# Patient Record
Sex: MALE | Race: WHITE
[De-identification: names, ages, dates, MRNs, and addresses within clinical notes are randomized per-mention and may not be internally consistent; named-entity substitution may affect disease eponyms.]

---

## 2018-02-14 ENCOUNTER — HOSPITAL ENCOUNTER (EMERGENCY)
Dept: HOSPITAL 56 - MW.ED | Age: 53
Discharge: HOME | End: 2018-02-14
Payer: SELF-PAY

## 2018-02-14 VITALS — SYSTOLIC BLOOD PRESSURE: 162 MMHG | DIASTOLIC BLOOD PRESSURE: 98 MMHG

## 2018-02-14 DIAGNOSIS — F17.210: ICD-10-CM

## 2018-02-14 DIAGNOSIS — I10: ICD-10-CM

## 2018-02-14 DIAGNOSIS — R10.32: Primary | ICD-10-CM

## 2018-02-14 DIAGNOSIS — N32.89: ICD-10-CM

## 2018-02-14 LAB
CHLORIDE SERPL-SCNC: 104 MMOL/L (ref 98–110)
SODIUM SERPL-SCNC: 137 MMOL/L (ref 136–146)

## 2018-02-14 PROCEDURE — 96372 THER/PROPH/DIAG INJ SC/IM: CPT

## 2018-02-14 PROCEDURE — 96361 HYDRATE IV INFUSION ADD-ON: CPT

## 2018-02-14 PROCEDURE — 82150 ASSAY OF AMYLASE: CPT

## 2018-02-14 PROCEDURE — 36415 COLL VENOUS BLD VENIPUNCTURE: CPT

## 2018-02-14 PROCEDURE — 81001 URINALYSIS AUTO W/SCOPE: CPT

## 2018-02-14 PROCEDURE — 96374 THER/PROPH/DIAG INJ IV PUSH: CPT

## 2018-02-14 PROCEDURE — 85025 COMPLETE CBC W/AUTO DIFF WBC: CPT

## 2018-02-14 PROCEDURE — 83690 ASSAY OF LIPASE: CPT

## 2018-02-14 PROCEDURE — 99284 EMERGENCY DEPT VISIT MOD MDM: CPT

## 2018-02-14 PROCEDURE — 87591 N.GONORRHOEAE DNA AMP PROB: CPT

## 2018-02-14 PROCEDURE — 87086 URINE CULTURE/COLONY COUNT: CPT

## 2018-02-14 PROCEDURE — 80053 COMPREHEN METABOLIC PANEL: CPT

## 2018-02-14 PROCEDURE — 87491 CHLMYD TRACH DNA AMP PROBE: CPT

## 2018-02-14 PROCEDURE — 84484 ASSAY OF TROPONIN QUANT: CPT

## 2018-02-14 PROCEDURE — 74178 CT ABD&PLV WO CNTR FLWD CNTR: CPT

## 2018-02-14 NOTE — CT
CT of the abdomen and pelvis with and without contrast.

 

HISTORY: Pain

 

TECHNIQUE: Axial CT images were obtained of the abdomen and pelvis before and following administratio
n of 100 mL of Isovue-370 in the right antecubital fossa without complication. Coronal and sagittal r
econstructions obtained.

 

FINDINGS: 

Mild atelectasis is noted within the lung bases.

 

There is a slightly hyperdense 2.4 cm enhancing nodule within the inferior left lateral hepatic lobe.
 This demonstrates a slightly hypodense central component. The gallbladder, adrenal glands, and splee
n appear normal. The pancreas appears normal. No bulky retroperitoneal lymphadenopathy and abdominal 
ascites.

 

 The kidneys enhance and function symmetrically without evidence of obstructive uropathy. Tiny renal 
cortical cysts are noted.

 

The large and small bowel are normal in caliber without evidence of obstruction. No focal pericolonic
 inflammation or stranding. The appendix is normal. Mild diffuse urinary bladder wall thickening. Mes
h is noted within the anterior pelvis bilaterally. No bulky pelvic lymphadenopathy or free pelvic flu
id. Prostate is mildly prominent.

 

No suspicious osseous abnormalities. Endplate sclerosis noted at L2-L3.

 

IMPRESSION: 

 

1. There is a 2.4 cm nodule within the left hepatic lobe. Correlation with previous imaging or a hepa
tic cortical CT/MRI may be beneficial.

2. Mild bladder wall thickening, possibly secondary to incomplete distention however correlation for 
a UTI may be beneficial.

3. Otherwise no acute findings within the abdomen or pelvis.

## 2018-02-14 NOTE — EDM.PDOC
ED HPI GENERAL MEDICAL PROBLEM





- General


Chief Complaint: Abdominal Pain


Stated Complaint: LOWER LEFT SIDE PAIN


Time Seen by Provider: 02/14/18 07:21


Source of Information: Reports: Patient





- History of Present Illness


INITIAL COMMENTS - FREE TEXT/NARRATIVE: 





HISTORY AND PHYSICAL:


History of present illness:


[Patient presents with left lower abdomen pain, he had an episode 3 weeks prior 

which she rated 8 out of 10 he had to lay on the floordue to pain this lasted 

for about 15-30 minutes] and resolved.





Again this morning he has an episode of 8 out of 10 pain which has eased to a 1 

out of 10 pain while he has been here no fever nausea vomiting chills sweats no 

chest pain shortness breath headache dizziness palpitation no bowel or urine 

symptoms











She notes he has had a new sexual partner around the time symptoms began.





He also is concerned about possible hernia as he has a history of bilateral 

role hernia with mesh repair








Review of systems: 


As per history of present illness and below otherwise all systems reviewed and 

negative.


Past medical history: 


As per history of present illness and as reviewed below otherwise 

noncontributory.


Surgical history: 


As per history of present illness and as reviewed below otherwise 

noncontributory.


Social history: 


No reported history of drug or alcohol abuse.


Family history: 


As per history of present illness and as reviewed below otherwise 

noncontributory.








Physical exam:


HEENT: Atraumatic, normocephalic, pupils reactive, negative for conjunctival 

pallor or scleral icterus, mucous membranes moist, throat clear, neck supple, 

nontender, trachea midline.


Lungs: Clear to auscultation, breath sounds equal bilaterally, chest nontender.


Heart: S1S2, regular, negative for clicks, rubs, or JVD.


Abdomen: Soft, nondistended, nontender. Negative for masses or 

hepatosplenomegaly. Negative for costovertebral tenderness.


Pelvis: Stable nontender.


Genitourinary: normal male genitalia no exudate at the meatus no mass scar or 

lesion no hernia appreciated


Rectal: Deferred.


Extremities: Atraumatic, negative for cords or calf pain. Neurovascular 

unremarkable.


Neuro: Awake, alert, oriented. Cranial nerves II through XII unremarkable. 

Cerebellum unremarkable. Motor and sensory unremarkable throughout. Exam 

nonfocal.


Diagnostics:


[CBC CMP UA with culture amylase lipase troponin


]GC Chlamydia


CT abdomen pelvis with and without contrast








Therapeutics:


[Rocephin 250 mg IM


Azithromycin 1000 mg by mouth


Cipro 500 by mouth twice a day #20 no refill





Follow cultures redirect





]


Impression: 


Thickened bladder wall on CT


[Flank/abdominal pain on left]


Definitive disposition and diagnosis as appropriate pending reevaluation and 

review of above.


  ** Left Lower Abdomen


Pain Score (Numeric/FACES): 3





- Related Data


 Allergies











Allergy/AdvReac Type Severity Reaction Status Date / Time


 


No Known Allergies Allergy   Verified 02/14/18 07:22











Home Meds: 


 Home Meds





. [No Known Home Meds]  03/27/15 [History]











Past Medical History


HEENT History: Reports: Impaired Vision


Other HEENT History: glasses


Cardiovascular History: Reports: Hypertension


Respiratory History: Reports: None


Gastrointestinal History: Reports: Other (See Below)


Other Gastrointestinal History: double hernia


Genitourinary History: Reports: None


Musculoskeletal History: Reports: None


Neurological History: Reports: None


Psychiatric History: Reports: Anxiety, Dementia


Endocrine/Metabolic History: Reports: None


Hematologic History: Reports: None


Immunologic History: Reports: None


Oncologic (Cancer) History: Reports: None


Dermatologic History: Reports: None





- Infectious Disease History


Infectious Disease History: Reports: None





- Past Surgical History


GI Surgical History: Reports: Hernia Repair/Other


Musculoskeletal Surgical History: Reports: Other (See Below)





Social & Family History





- Family History


Family Medical History: Noncontributory





- Tobacco Use


Smoking Status *Q: Current Every Day Smoker


Years of Tobacco use: 20


Packs/Tins Daily: 1


Second Hand Smoke Exposure: No





- Alcohol Use


Days Per Week of Alcohol Use: 7


Number of Drinks Per Day: 2


Total Drinks Per Week: 14





- Recreational Drug Use


Recreational Drug Use: Yes


Drug Use in Last 12 Months: Yes


Recreational Drug Type: Reports: Marijuana/Hashish


Recreational Drug Use Frequency: Not Used In Over 3 Months





ED ROS GENERAL





- Review of Systems


Review Of Systems: ROS reveals no pertinent complaints other than HPI.





ED EXAM, GENERAL





- Physical Exam


Exam: See Below





Course





- Vital Signs


Last Recorded V/S: 


 Last Vital Signs











Temp  98.2 F   02/14/18 08:09


 


Pulse  71   02/14/18 09:17


 


Resp  16   02/14/18 09:17


 


BP  155/106 H  02/14/18 09:17


 


Pulse Ox  95   02/14/18 09:17














- Orders/Labs/Meds


Orders: 


 Active Orders 24 hr











 Category Date Time Status


 


 CHLAMYDIA AND GONORRHEA BY [facility] Stat Lab  02/14/18 10:23 Ordered


 


 CULTURE URINE [RM] Stat Lab  02/14/18 09:49 Received











Labs: 


 Laboratory Tests











  02/14/18 02/14/18 02/14/18 Range/Units





  07:12 07:36 07:36 


 


WBC   7.09   (4.0-11.0)  K/uL


 


RBC   5.41   (4.50-5.90)  M/uL


 


Hgb   15.9   (13.0-17.0)  g/dL


 


Hct   47.2   (38.0-50.0)  %


 


MCV   87.2   (80.0-98.0)  fL


 


MCH   29.4   (27.0-32.0)  pg


 


MCHC   33.7   (31.0-37.0)  g/dL


 


RDW Std Deviation   42.4   (28.0-62.0)  fl


 


RDW Coeff of Rosaura   14   (11.0-15.0)  %


 


Plt Count   217   (150-400)  K/uL


 


MPV   8.90   (7.40-12.00)  fL


 


Neut % (Auto)   62.3   (48.0-80.0)  %


 


Lymph % (Auto)   28.8   (16.0-40.0)  %


 


Mono % (Auto)   6.5   (0.0-15.0)  %


 


Eos % (Auto)   2.1   (0.0-7.0)  %


 


Baso % (Auto)   0.3   (0.0-1.5)  %


 


Neut # (Auto)   4.4   (1.4-5.7)  K/uL


 


Lymph # (Auto)   2.0   (0.6-2.4)  K/uL


 


Mono # (Auto)   0.5   (0.0-0.8)  K/uL


 


Eos # (Auto)   0.2   (0.0-0.7)  K/uL


 


Baso # (Auto)   0.0   (0.0-0.1)  K/uL


 


Nucleated RBC %   0.0   /100WBC


 


Nucleated RBCs #   0   K/uL


 


Sodium    137  (136-146)  mmol/L


 


Potassium    4.1  (3.5-5.1)  mmol/L


 


Chloride    104  ()  mmol/L


 


Carbon Dioxide    24  (21-31)  mmol/L


 


BUN    13  (6.0-23.0)  mg/dL


 


Creatinine    0.9  (0.6-1.5)  mg/dL


 


Est Cr Clr Drug Dosing    96.88  mL/min


 


Estimated GFR (MDRD)    > 60.0  ml/min


 


Glucose    126 H  ()  mg/dL


 


Calcium    9.5  (8.8-10.8)  mg/dL


 


Total Bilirubin    0.5  (0.1-1.5)  mg/dL


 


AST    25  (5-40)  IU/L


 


ALT    49  (8-54)  IU/L


 


Alkaline Phosphatase    97  ()  


 


Troponin I     (0.0-0.29)  NG/ML


 


Total Protein    6.7  (6.0-8.0)  g/dL


 


Albumin    3.9  (3.5-5.0)  g/dL


 


Globulin    2.8  (2.0-3.5)  g/dL


 


Albumin/Globulin Ratio    1.4  (1.3-2.8)  


 


Amylase     (10-90)  U/L


 


Lipase     (7-80)  U/L


 


Urine Color  YELLOW    


 


Urine Appearance  CLEAR    


 


Urine pH  6.5    (5.0-8.0)  


 


Ur Specific Gravity  1.010    (1.001-1.035)  


 


Urine Protein  NEGATIVE    (NEGATIVE)  mg/dL


 


Urine Glucose (UA)  NEGATIVE    (NEGATIVE)  mg/dL


 


Urine Ketones  NEGATIVE    (NEGATIVE)  mg/dL


 


Urine Occult Blood  NEGATIVE    (NEGATIVE)  


 


Urine Nitrite  NEGATIVE    (NEGATIVE)  


 


Urine Bilirubin  NEGATIVE    (NEGATIVE)  


 


Urine Urobilinogen  0.2    (<2.0)  EU/dL


 


Ur Leukocyte Esterase  NEGATIVE    (NEGATIVE)  


 


Urine RBC  0-1    (0-2/HPF)  


 


Urine WBC  0-1    (0-5/HPF)  


 


Ur Epithelial Cells  RARE    (NONE-FEW)  


 


Urine Bacteria  RARE    (NEGATIVE)  


 


Urine Mucus  LIGHT    (NONE-MOD)  














  02/14/18 Range/Units





  07:36 


 


WBC   (4.0-11.0)  K/uL


 


RBC   (4.50-5.90)  M/uL


 


Hgb   (13.0-17.0)  g/dL


 


Hct   (38.0-50.0)  %


 


MCV   (80.0-98.0)  fL


 


MCH   (27.0-32.0)  pg


 


MCHC   (31.0-37.0)  g/dL


 


RDW Std Deviation   (28.0-62.0)  fl


 


RDW Coeff of Rosaura   (11.0-15.0)  %


 


Plt Count   (150-400)  K/uL


 


MPV   (7.40-12.00)  fL


 


Neut % (Auto)   (48.0-80.0)  %


 


Lymph % (Auto)   (16.0-40.0)  %


 


Mono % (Auto)   (0.0-15.0)  %


 


Eos % (Auto)   (0.0-7.0)  %


 


Baso % (Auto)   (0.0-1.5)  %


 


Neut # (Auto)   (1.4-5.7)  K/uL


 


Lymph # (Auto)   (0.6-2.4)  K/uL


 


Mono # (Auto)   (0.0-0.8)  K/uL


 


Eos # (Auto)   (0.0-0.7)  K/uL


 


Baso # (Auto)   (0.0-0.1)  K/uL


 


Nucleated RBC %   /100WBC


 


Nucleated RBCs #   K/uL


 


Sodium   (136-146)  mmol/L


 


Potassium   (3.5-5.1)  mmol/L


 


Chloride   ()  mmol/L


 


Carbon Dioxide   (21-31)  mmol/L


 


BUN   (6.0-23.0)  mg/dL


 


Creatinine   (0.6-1.5)  mg/dL


 


Est Cr Clr Drug Dosing   mL/min


 


Estimated GFR (MDRD)   ml/min


 


Glucose   ()  mg/dL


 


Calcium   (8.8-10.8)  mg/dL


 


Total Bilirubin   (0.1-1.5)  mg/dL


 


AST   (5-40)  IU/L


 


ALT   (8-54)  IU/L


 


Alkaline Phosphatase   ()  


 


Troponin I  < 0.10  (0.0-0.29)  NG/ML


 


Total Protein   (6.0-8.0)  g/dL


 


Albumin   (3.5-5.0)  g/dL


 


Globulin   (2.0-3.5)  g/dL


 


Albumin/Globulin Ratio   (1.3-2.8)  


 


Amylase  30  (10-90)  U/L


 


Lipase  < 9  (7-80)  U/L


 


Urine Color   


 


Urine Appearance   


 


Urine pH   (5.0-8.0)  


 


Ur Specific Gravity   (1.001-1.035)  


 


Urine Protein   (NEGATIVE)  mg/dL


 


Urine Glucose (UA)   (NEGATIVE)  mg/dL


 


Urine Ketones   (NEGATIVE)  mg/dL


 


Urine Occult Blood   (NEGATIVE)  


 


Urine Nitrite   (NEGATIVE)  


 


Urine Bilirubin   (NEGATIVE)  


 


Urine Urobilinogen   (<2.0)  EU/dL


 


Ur Leukocyte Esterase   (NEGATIVE)  


 


Urine RBC   (0-2/HPF)  


 


Urine WBC   (0-5/HPF)  


 


Ur Epithelial Cells   (NONE-FEW)  


 


Urine Bacteria   (NEGATIVE)  


 


Urine Mucus   (NONE-MOD)  











Meds: 


Medications














Discontinued Medications














Generic Name Dose Route Start Last Admin





  Trade Name Micheleq  PRN Reason Stop Dose Admin


 


Azithromycin  1,000 mg  02/14/18 10:23  





  Zithromax  PO  02/14/18 10:24  





  NOW STA   


 


Sodium Chloride  1,000 mls @ 999 mls/hr  02/14/18 08:29  02/14/18 09:12





  Normal Saline  IV  02/14/18 09:29  999 mls/hr





  STAT ONE   Administration


 


Ceftriaxone Sodium 250 mg/  1 mls @ 1 mls/sec  02/14/18 10:23  





  Lidocaine HCl  IM  02/14/18 10:24  





  ONETIME ONE   


 


Iopamidol  100 ml  02/14/18 08:33  02/14/18 08:37





  Isovue Multipack-370 (76%)  IVPUSH  02/14/18 08:34  100 ml





  ONETIME STA   Administration


 


Ketorolac Tromethamine  30 mg  02/14/18 08:30  02/14/18 09:15





  Toradol  IVPUSH  02/14/18 08:31  30 mg





  ONETIME ONE   Administration














Departure





- Departure


Time of Disposition: 10:26


Disposition: Home, Self-Care 01


Condition: Good


Clinical Impression: 


 Abdominal pain








- Discharge Information


Referrals: 


PCP,None [Primary Care Provider] - 


Forms:  ED Department Discharge


Additional Instructions: 


Urine cultures are pending as discussed


Medication as prescribed


Return if symptoms persist or worsen


Follow-up with primary care in 2 weeks sooner as needed





[facility] - Primary Care


[address] [place] [id number]


Phone: [phone]


Fax: [phone]








The following information is given to patients seen in the emergency department 

who are being discharged to home. This information is to outline your options 

for follow-up care. We provide all patients seen in our emergency department 

with a follow-up referral.





The need for follow-up, as well as the timing and circumstances, are variable 

depending upon the specifics of your emergency department visit.





If you don't have a primary care physician on staff, we will provide you with a 

referral. We always advise you to contact your personal physician following an 

emergency department visit to inform them of the circumstance of the visit and 

for follow-up with them and/or the need for any referrals to a consulting 

specialist.





The emergency department will also refer you to a specialist when appropriate. 

This referral assures that you have the opportunity for follow-up care with a 

specialist. All of these measure are taken in an effort to provide you with 

optimal care, which includes your follow-up.





Under all circumstances we always encourage you to contact your private 

physician who remains a resource for coordinating your care. When calling for 

follow-up care, please make the office aware that this follow-up is from your 

recent emergency room visit. If for any reason you are refused follow-up, 

please contact the [facility] emergency department at [phone] 

and asked to speak to the emergency department charge nurse.








- My Orders


Last 24 Hours: 


My Active Orders





02/14/18 09:49


CULTURE URINE [RM] Stat 





02/14/18 10:23


CHLAMYDIA AND GONORRHEA BY [facility]MA Stat 














- Assessment/Plan


Last 24 Hours: 


My Active Orders





02/14/18 09:49


CULTURE URINE [RM] Stat 





02/14/18 10:23


CHLAMYDIA AND GONORRHEA BY TMA Stat none

## 2018-05-04 ENCOUNTER — HOSPITAL ENCOUNTER (EMERGENCY)
Dept: HOSPITAL 56 - MW.ED | Age: 53
Discharge: HOME | End: 2018-05-04
Payer: SELF-PAY

## 2018-05-04 VITALS — SYSTOLIC BLOOD PRESSURE: 184 MMHG | DIASTOLIC BLOOD PRESSURE: 123 MMHG

## 2018-05-04 DIAGNOSIS — R10.9: ICD-10-CM

## 2018-05-04 DIAGNOSIS — F19.10: Primary | ICD-10-CM

## 2018-05-04 DIAGNOSIS — F17.210: ICD-10-CM

## 2018-05-04 DIAGNOSIS — I10: ICD-10-CM

## 2018-05-04 LAB
CHLORIDE SERPL-SCNC: 105 MMOL/L (ref 98–107)
SODIUM SERPL-SCNC: 138 MMOL/L (ref 136–148)

## 2018-05-04 PROCEDURE — 83690 ASSAY OF LIPASE: CPT

## 2018-05-04 PROCEDURE — 96374 THER/PROPH/DIAG INJ IV PUSH: CPT

## 2018-05-04 PROCEDURE — 99284 EMERGENCY DEPT VISIT MOD MDM: CPT

## 2018-05-04 PROCEDURE — 85610 PROTHROMBIN TIME: CPT

## 2018-05-04 PROCEDURE — 71045 X-RAY EXAM CHEST 1 VIEW: CPT

## 2018-05-04 PROCEDURE — 81001 URINALYSIS AUTO W/SCOPE: CPT

## 2018-05-04 PROCEDURE — 36415 COLL VENOUS BLD VENIPUNCTURE: CPT

## 2018-05-04 PROCEDURE — 96361 HYDRATE IV INFUSION ADD-ON: CPT

## 2018-05-04 PROCEDURE — 82150 ASSAY OF AMYLASE: CPT

## 2018-05-04 PROCEDURE — 80053 COMPREHEN METABOLIC PANEL: CPT

## 2018-05-04 PROCEDURE — 85025 COMPLETE CBC W/AUTO DIFF WBC: CPT

## 2018-05-04 PROCEDURE — 80305 DRUG TEST PRSMV DIR OPT OBS: CPT

## 2018-05-04 PROCEDURE — 93005 ELECTROCARDIOGRAM TRACING: CPT

## 2018-05-04 PROCEDURE — 74176 CT ABD & PELVIS W/O CONTRAST: CPT

## 2018-05-04 NOTE — EDM.PDOC
ED HPI GENERAL MEDICAL PROBLEM





- General


Chief Complaint: Abdominal Pain


Stated Complaint: ABDOMINAL PAIN


Time Seen by Provider: 05/04/18 09:55





- History of Present Illness


INITIAL COMMENTS - FREE TEXT/NARRATIVE: 


HISTORY AND PHYSICAL:





History of present illness:


Patient 52-year-old white male presents with concern of left-sided abdominal 

pain he's had nausea he denies vomiting diarrhea he has some labs on February 

with an extensive workup that was negative. He denies urinary symptoms or other 

concerns





Review of systems: 


As per history of present illness and below otherwise all systems reviewed and 

negative.





Past medical history: 


As per history of present illness and as reviewed below otherwise 

noncontributory.





Surgical history: 


As per history of present illness and as reviewed below otherwise 

noncontributory.





Social history: 


No reported history of drug or alcohol abuse.





Family history: 


As per history of present illness and as reviewed below otherwise 

noncontributory.





Physical exam:


HEENT: Atraumatic, normocephalic, pupils reactive, negative for conjunctival 

pallor or scleral icterus, mucous membranes moist, throat clear, neck supple, 

nontender, trachea midline.


Lungs: Clear to auscultation, breath sounds equal bilaterally, chest nontender.


Heart: S1S2, regular, negative for clicks, rubs, or JVD.


Abdomen: Soft, nondistended, nonlocalized left-sided tenderness Negative for 

masses or hepatosplenomegaly. Negative for costovertebral tenderness.


Pelvis: Stable nontender.


Genitourinary: Deferred.


Rectal: Deferred.


Extremities: Atraumatic, negative for cords or calf pain. Neurovascular 

unremarkable.


Neuro: Awake, alert, oriented. Cranial nerves II through XII unremarkable. 

Cerebellum unremarkable. Motor and sensory unremarkable throughout. Exam 

nonfocal.





Diagnostics:


CBC CMP UA UDS lipase EKG CT abdomen and pelvis





Therapeutics:


Saline 1 L bolus Toradol 30 mg IV Zofran 4 mg IV





Impression: 


#1 left-sided abdominal pain





Definitive disposition and diagnosis as appropriate pending reevaluation and 

review of above.








- Related Data


 Allergies











Allergy/AdvReac Type Severity Reaction Status Date / Time


 


No Known Allergies Allergy   Verified 02/14/18 07:22











Home Meds: 


 Home Meds





Multivitamins/Minerals [Vitamins and Minerals]  05/04/18 [History]











Past Medical History


HEENT History: Reports: Impaired Vision


Other HEENT History: glasses


Cardiovascular History: Reports: Hypertension


Respiratory History: Reports: None


Gastrointestinal History: Reports: Other (See Below)


Other Gastrointestinal History: double hernia


Genitourinary History: Reports: None


Musculoskeletal History: Reports: None


Neurological History: Reports: None


Psychiatric History: Reports: Anxiety, Dementia


Endocrine/Metabolic History: Reports: None


Hematologic History: Reports: None


Immunologic History: Reports: None


Oncologic (Cancer) History: Reports: None


Dermatologic History: Reports: None





- Infectious Disease History


Infectious Disease History: Reports: None





- Past Surgical History


GI Surgical History: Reports: Hernia Repair/Other


Musculoskeletal Surgical History: Reports: Other (See Below)





Social & Family History





- Family History


Family Medical History: Noncontributory





- Tobacco Use


Smoking Status *Q: Current Every Day Smoker


Years of Tobacco use: 20


Packs/Tins Daily: 1


Second Hand Smoke Exposure: No





- Caffeine Use


Caffeine Use: Reports: Coffee





- Alcohol Use


Days Per Week of Alcohol Use: 7


Number of Drinks Per Day: 2


Total Drinks Per Week: 14





- Recreational Drug Use


Recreational Drug Use: Yes


Drug Use in Last 12 Months: Yes


Recreational Drug Type: Reports: Marijuana/Hashish


Other Recreational Drug Type: one time use in the last 12 months


Recreational Drug Use Frequency: Not Used In Over 3 Months





ED ROS GENERAL





- Review of Systems


Review Of Systems: ROS reveals no pertinent complaints other than HPI.





ED EXAM, GENERAL





- Physical Exam


Exam: See Below (The dictation)





Course





- Vital Signs


Last Recorded V/S: 


 Last Vital Signs











Temp  36.5 C   05/04/18 10:07


 


Pulse  85   05/04/18 10:07


 


Resp  20   05/04/18 10:07


 


BP  184/123 H  05/04/18 10:07


 


Pulse Ox  98   05/04/18 10:07














- Orders/Labs/Meds


Orders: 


 Active Orders 24 hr











 Category Date Time Status


 


 EKG Documentation Completion [RC] STAT Care  05/04/18 09:55 Active


 


 CDIFF TOX A+B [OP] Stat Lab  05/04/18 10:03 Ordered


 


 CULTURE STOOL + CAMPY+SHIGATOX [RM] Stat Lab  05/04/18 10:03 Ordered


 


 DRUG SCREEN, URINE [URCHEM] Stat Lab  05/04/18 11:20 Ordered


 


 UA W/MICROSCOPIC [URIN] Stat Lab  05/04/18 11:20 Ordered


 


 Sodium Chloride 0.9% [Saline Flush] Med  05/04/18 09:55 Active





 10 ml FLUSH ASDIRECTED PRN   


 


 Sodium Chloride 0.9% [Saline Flush] Med  05/04/18 09:55 Active





 2.5 ml FLUSH ASDIRECTED PRN   


 


 Saline Lock Insert [OM.PC] Stat Oth  05/04/18 09:55 Ordered








 Medication Orders





Sodium Chloride (Saline Flush)  10 ml FLUSH ASDIRECTED PRN


   PRN Reason: Keep Vein Open


Sodium Chloride (Saline Flush)  2.5 ml FLUSH ASDIRECTED PRN


   PRN Reason: Keep Vein Open








Labs: 


 Laboratory Tests











  05/04/18 05/04/18 05/04/18 Range/Units





  10:05 10:05 10:05 


 


WBC  7.33    (4.0-11.0)  K/uL


 


RBC  5.10    (4.50-5.90)  M/uL


 


Hgb  15.2    (13.0-17.0)  g/dL


 


Hct  43.8    (38.0-50.0)  %


 


MCV  85.9    (80.0-98.0)  fL


 


MCH  29.8    (27.0-32.0)  pg


 


MCHC  34.7    (31.0-37.0)  g/dL


 


RDW Std Deviation  41.4    (28.0-62.0)  fl


 


RDW Coeff of Rosaura  13    (11.0-15.0)  %


 


Plt Count  249    (150-400)  K/uL


 


MPV  8.80    (7.40-12.00)  fL


 


Neut % (Auto)  55.7    (48.0-80.0)  %


 


Lymph % (Auto)  35.3    (16.0-40.0)  %


 


Mono % (Auto)  6.3    (0.0-15.0)  %


 


Eos % (Auto)  2.3    (0.0-7.0)  %


 


Baso % (Auto)  0.4    (0.0-1.5)  %


 


Neut # (Auto)  4.1    (1.4-5.7)  K/uL


 


Lymph # (Auto)  2.6 H    (0.6-2.4)  K/uL


 


Mono # (Auto)  0.5    (0.0-0.8)  K/uL


 


Eos # (Auto)  0.2    (0.0-0.7)  K/uL


 


Baso # (Auto)  0.0    (0.0-0.1)  K/uL


 


Nucleated RBC %  0.0    /100WBC


 


Nucleated RBCs #  0    K/uL


 


INR   0.97   


 


Sodium    138  (136-148)  mmol/L


 


Potassium    3.9  (3.5-5.1)  mmol/L


 


Chloride    105  ()  mmol/L


 


Carbon Dioxide    22.6  (21.0-32.0)  mmol/L


 


BUN    20 H  (7.0-18.0)  mg/dL


 


Creatinine    1.0  (0.8-1.3)  mg/dL


 


Est Cr Clr Drug Dosing    TNP  


 


Estimated GFR (MDRD)    > 60.0  ml/min


 


Glucose    156 H  ()  mg/dL


 


Calcium    9.7  (8.5-10.1)  mg/dL


 


Total Bilirubin    0.3  (0.2-1.0)  mg/dL


 


AST    31  (15-37)  IU/L


 


ALT    48  (14-63)  IU/L


 


Alkaline Phosphatase    133 H  ()  U/L


 


Total Protein    7.7  (6.4-8.2)  g/dL


 


Albumin    3.9  (3.4-5.0)  g/dL


 


Globulin    3.8 H  (2.0-3.5)  g/dL


 


Albumin/Globulin Ratio    1.0 L  (1.3-2.8)  


 


Amylase    26  ()  U/L


 


Lipase    65 L  ()  U/L


 


Urine Color     


 


Urine Appearance     


 


Urine pH     (5.0-8.0)  


 


Ur Specific Gravity     (1.001-1.035)  


 


Urine Protein     (NEGATIVE)  mg/dL


 


Urine Glucose (UA)     (NEGATIVE)  mg/dL


 


Urine Ketones     (NEGATIVE)  mg/dL


 


Urine Occult Blood     (NEGATIVE)  


 


Urine Nitrite     (NEGATIVE)  


 


Urine Bilirubin     (NEGATIVE)  


 


Urine Urobilinogen     (<2.0)  EU/dL


 


Ur Leukocyte Esterase     (NEGATIVE)  


 


Urine RBC     (0-2/HPF)  


 


Urine WBC     (0-5/HPF)  


 


Ur Epithelial Cells     (NONE-FEW)  


 


Urine Bacteria     (NEGATIVE)  


 


Urine Opiates Screen     (NEGATIVE)  


 


Ur Oxycodone Screen     (NEGATIVE)  


 


Urine Methadone Screen     (NEGATIVE)  


 


Ur Barbiturates Screen     (NEGATIVE)  


 


Ur Phencyclidine Scrn     (NEGATIVE)  


 


Ur Amphetamine Screen     (NEGATIVE)  


 


U Methamphetamines Scrn     (NEGATIVE)  


 


U Benzodiazepines Scrn     (NEGATIVE)  


 


U Cocaine Metab Screen     (NEGATIVE)  


 


U Marijuana (THC) Screen     (NEGATIVE)  














  05/04/18 05/04/18 Range/Units





  11:20 11:20 


 


WBC    (4.0-11.0)  K/uL


 


RBC    (4.50-5.90)  M/uL


 


Hgb    (13.0-17.0)  g/dL


 


Hct    (38.0-50.0)  %


 


MCV    (80.0-98.0)  fL


 


MCH    (27.0-32.0)  pg


 


MCHC    (31.0-37.0)  g/dL


 


RDW Std Deviation    (28.0-62.0)  fl


 


RDW Coeff of Rosaura    (11.0-15.0)  %


 


Plt Count    (150-400)  K/uL


 


MPV    (7.40-12.00)  fL


 


Neut % (Auto)    (48.0-80.0)  %


 


Lymph % (Auto)    (16.0-40.0)  %


 


Mono % (Auto)    (0.0-15.0)  %


 


Eos % (Auto)    (0.0-7.0)  %


 


Baso % (Auto)    (0.0-1.5)  %


 


Neut # (Auto)    (1.4-5.7)  K/uL


 


Lymph # (Auto)    (0.6-2.4)  K/uL


 


Mono # (Auto)    (0.0-0.8)  K/uL


 


Eos # (Auto)    (0.0-0.7)  K/uL


 


Baso # (Auto)    (0.0-0.1)  K/uL


 


Nucleated RBC %    /100WBC


 


Nucleated RBCs #    K/uL


 


INR    


 


Sodium    (136-148)  mmol/L


 


Potassium    (3.5-5.1)  mmol/L


 


Chloride    ()  mmol/L


 


Carbon Dioxide    (21.0-32.0)  mmol/L


 


BUN    (7.0-18.0)  mg/dL


 


Creatinine    (0.8-1.3)  mg/dL


 


Est Cr Clr Drug Dosing    


 


Estimated GFR (MDRD)    ml/min


 


Glucose    ()  mg/dL


 


Calcium    (8.5-10.1)  mg/dL


 


Total Bilirubin    (0.2-1.0)  mg/dL


 


AST    (15-37)  IU/L


 


ALT    (14-63)  IU/L


 


Alkaline Phosphatase    ()  U/L


 


Total Protein    (6.4-8.2)  g/dL


 


Albumin    (3.4-5.0)  g/dL


 


Globulin    (2.0-3.5)  g/dL


 


Albumin/Globulin Ratio    (1.3-2.8)  


 


Amylase    ()  U/L


 


Lipase    ()  U/L


 


Urine Color  YELLOW   


 


Urine Appearance  CLEAR   


 


Urine pH  6.0   (5.0-8.0)  


 


Ur Specific Gravity  1.020   (1.001-1.035)  


 


Urine Protein  NEGATIVE   (NEGATIVE)  mg/dL


 


Urine Glucose (UA)  100 H   (NEGATIVE)  mg/dL


 


Urine Ketones  NEGATIVE   (NEGATIVE)  mg/dL


 


Urine Occult Blood  NEGATIVE   (NEGATIVE)  


 


Urine Nitrite  NEGATIVE   (NEGATIVE)  


 


Urine Bilirubin  NEGATIVE   (NEGATIVE)  


 


Urine Urobilinogen  0.2   (<2.0)  EU/dL


 


Ur Leukocyte Esterase  NEGATIVE   (NEGATIVE)  


 


Urine RBC  0-1   (0-2/HPF)  


 


Urine WBC  2-5   (0-5/HPF)  


 


Ur Epithelial Cells  RARE   (NONE-FEW)  


 


Urine Bacteria  RARE   (NEGATIVE)  


 


Urine Opiates Screen   NEGATIVE  (NEGATIVE)  


 


Ur Oxycodone Screen   NEGATIVE  (NEGATIVE)  


 


Urine Methadone Screen   NEGATIVE  (NEGATIVE)  


 


Ur Barbiturates Screen   NEGATIVE  (NEGATIVE)  


 


Ur Phencyclidine Scrn   NEGATIVE  (NEGATIVE)  


 


Ur Amphetamine Screen   NEGATIVE  (NEGATIVE)  


 


U Methamphetamines Scrn   POSITIVE  (NEGATIVE)  


 


U Benzodiazepines Scrn   NEGATIVE  (NEGATIVE)  


 


U Cocaine Metab Screen   NEGATIVE  (NEGATIVE)  


 


U Marijuana (THC) Screen   NEGATIVE  (NEGATIVE)  











Meds: 


Medications











Generic Name Dose Route Start Last Admin





  Trade Name Freq  PRN Reason Stop Dose Admin


 


Sodium Chloride  10 ml  05/04/18 09:55  





  Saline Flush  FLUSH   





  ASDIRECTED PRN   





  Keep Vein Open   





     





     





     


 


Sodium Chloride  2.5 ml  05/04/18 09:55  





  Saline Flush  FLUSH   





  ASDIRECTED PRN   





  Keep Vein Open   





     





     





     














Discontinued Medications














Generic Name Dose Route Start Last Admin





  Trade Name Freq  PRN Reason Stop Dose Admin


 


Sodium Chloride  1,000 mls @ 999 mls/hr  05/04/18 09:55  05/04/18 10:22





  Normal Saline  IV  05/04/18 10:55  999 mls/hr





  STAT ONE   Administration





     





     





     





     


 


Ketorolac Tromethamine  30 mg  05/04/18 09:55  05/04/18 10:09





  Toradol  IVPUSH  05/04/18 09:56  30 mg





  ONETIME ONE   Administration





     





     





     





     














Departure





- Departure


Time of Disposition: 11:53


Disposition: Home, Self-Care 01


Condition: Good


Clinical Impression: 


 Substance abuse, Abdominal pain








- Discharge Information


Referrals: 


PCP,None [Primary Care Provider] - 


Forms:  ED Department Discharge


Additional Instructions: 


The following information is given to patients seen in the emergency department 

who are being discharged to home. This information is to outline your options 

for follow-up care. We provide all patients seen in our emergency department 

with a follow-up referral.





The need for follow-up, as well as the timing and circumstances, are variable 

depending upon the specifics of your emergency department visit.





If you don't have a primary care physician on staff, we will provide you with a 

referral. We always advise you to contact your personal physician following an 

emergency department visit to inform them of the circumstance of the visit and 

for follow-up with them and/or the need for any referrals to a consulting 

specialist.





The emergency department will also refer you to a specialist when appropriate. 

This referral assures that you have the opportunity for followup care with a 

specialist. All of these measure are taken in an effort to provide you with 

optimal care, which includes your followup.





Under all circumstances we always encourage you to contact your private 

physician who remains a resource for coordinating  your care. When calling for 

followup care, please make the office aware that this follow-up is from your 

recent emergency room visit. If for any reason you are refused follow-up, 

please contact the [facility] emergency department at [phone] 

and asked to speak to the emergency department charge nurse.

















[facility]


Primary Care


[facility] [address] [place] [id number]


Phone: [phone]


Fax: [phone]








[facility]


Specialty Care - General Surgery


[address]


[address] [place] [address], [address]


[place] [address]


Phone: [phone]


Fax: [phone]














Follow-up primary medical doctor and/or clinic above return as needed as 

discussed





- My Orders


Last 24 Hours: 


My Active Orders





05/04/18 09:55


EKG Documentation Completion [RC] STAT 


Sodium Chloride 0.9% [Saline Flush]   10 ml FLUSH ASDIRECTED PRN 


Sodium Chloride 0.9% [Saline Flush]   2.5 ml FLUSH ASDIRECTED PRN 


Saline Lock Insert [OM.PC] Stat 





05/04/18 10:03


CDIFF TOX A+B [OP] Stat 


CULTURE STOOL + CAMPY+SHIGATOX [RM] Stat 





05/04/18 11:20


DRUG SCREEN, URINE [URCHEM] Stat 


UA W/MICROSCOPIC [URIN] Stat 














- Assessment/Plan


Last 24 Hours: 


My Active Orders





05/04/18 09:55


EKG Documentation Completion [RC] STAT 


Sodium Chloride 0.9% [Saline Flush]   10 ml FLUSH ASDIRECTED PRN 


Sodium Chloride 0.9% [Saline Flush]   2.5 ml FLUSH ASDIRECTED PRN 


Saline Lock Insert [OM.PC] Stat 





05/04/18 10:03


CDIFF TOX A+B [OP] Stat 


CULTURE STOOL + CAMPY+SHIGATOX [RM] Stat 





05/04/18 11:20


DRUG SCREEN, URINE [URCHEM] Stat 


UA W/MICROSCOPIC [URIN] Stat

## 2018-05-04 NOTE — CR
EXAMINATION: PA chest radiograph.

 

HISTORY: Shortness of breath.

 

FINDINGS: 

The trachea is midline. The cardiomediastinal silhouette is within normal limits. No pulmonary infilt
rates, effusions or pneumothorax.

 

Osseous structures appear unremarkable.

 

IMPRESSION: 

No acute cardiopulmonary process.

## 2018-05-04 NOTE — CT
CT of the abdomen and pelvis without contrast.

 

HISTORY: Pain

 

TECHNIQUE: Axial CT images were obtained of the abdomen and pelvis without contrast. Coronal and sagi
ttal reconstructions obtained.

 

FINDINGS: 

The lung bases are clear, no pleural effusion.

 

The liver, spleen, adrenal glands, and pancreas appear unremarkable for noncontrast examination. The 
gallbladder appears normal.  There is no bulky retroperitoneal lymphadenopathy. No abdominal ascites.


 

There are no calcifications noted within the kidneys or along the courses of the ureters bilaterally.
 There is mild left perirenal stranding.

 

The large and small bowel are normal in caliber without evidence of obstruction. The appendix appears
 normal. There is no bulky pelvic lymphadenopathy. No free fluid. No free air. The urinary bladder ap
pears normal. Mesh from prior hernia repair noted within the lower pelvis.

 

The visualized osseous structures appear normal. Chronic spondylolysis at L5 on the left and bilatera
lly at L2. Otherwise underlying degenerative changes noted within the lumbar spine and hips.

 

IMPRESSION: 

 

1. Minimal left perirenal stranding, correlate clinically a UTI.

2. Mesh hernia repair changes noted within the pelvis.

3. Otherwise no acute findings noted.